# Patient Record
Sex: FEMALE | Race: OTHER | URBAN - METROPOLITAN AREA
[De-identification: names, ages, dates, MRNs, and addresses within clinical notes are randomized per-mention and may not be internally consistent; named-entity substitution may affect disease eponyms.]

---

## 2018-09-04 ENCOUNTER — HOSPITAL ENCOUNTER (OUTPATIENT)
Age: 28
Setting detail: SPECIMEN
Discharge: HOME | End: 2018-09-04
Payer: COMMERCIAL

## 2018-09-04 LAB
ABSOLUTE EOS #: 0.06 K/UL (ref 0–0.44)
ABSOLUTE IMMATURE GRANULOCYTE: 0.04 K/UL (ref 0–0.3)
ABSOLUTE LYMPH #: 2.56 K/UL (ref 1.1–3.7)
ABSOLUTE MONO #: 0.27 K/UL (ref 0.1–1.2)
BASOPHILS # BLD: 0 % (ref 0–2)
BASOPHILS ABSOLUTE: <0.03 K/UL (ref 0–0.2)
DIFFERENTIAL TYPE: ABNORMAL
EOSINOPHILS RELATIVE PERCENT: 1 % (ref 1–4)
IGE: 968 IU/ML
IMMATURE GRANULOCYTES: 1 %
LYMPHOCYTES # BLD: 31 % (ref 24–43)
MONOCYTES # BLD: 3 % (ref 3–12)
PLATELET ESTIMATE: ABNORMAL
RBC # BLD: ABNORMAL 10*6/UL
SEG NEUTROPHILS: 64 % (ref 36–65)
SEGMENTED NEUTROPHILS ABSOLUTE COUNT: 5.28 K/UL (ref 1.5–8.1)
WBC # BLD: 8.2 K/UL (ref 3.5–11.3)
WBC # BLD: ABNORMAL 10*3/UL

## 2018-09-05 LAB — ANTI-NUCLEAR ANTIBODY (ANA): NEGATIVE

## 2021-12-01 ENCOUNTER — OFFICE VISIT (OUTPATIENT)
Dept: FAMILY MEDICINE CLINIC | Age: 31
End: 2021-12-01
Payer: COMMERCIAL

## 2021-12-01 ENCOUNTER — HOSPITAL ENCOUNTER (OUTPATIENT)
Age: 31
Setting detail: SPECIMEN
Discharge: HOME | End: 2021-12-01

## 2021-12-01 VITALS
HEART RATE: 78 BPM | DIASTOLIC BLOOD PRESSURE: 78 MMHG | OXYGEN SATURATION: 99 % | SYSTOLIC BLOOD PRESSURE: 106 MMHG | TEMPERATURE: 98.5 F | RESPIRATION RATE: 15 BRPM

## 2021-12-01 DIAGNOSIS — Z11.52 ENCOUNTER FOR SCREENING FOR COVID-19: ICD-10-CM

## 2021-12-01 DIAGNOSIS — J06.9 URI WITH COUGH AND CONGESTION: Primary | ICD-10-CM

## 2021-12-01 DIAGNOSIS — Z20.822 EXPOSURE TO COVID-19 VIRUS: ICD-10-CM

## 2021-12-01 PROCEDURE — 99202 OFFICE O/P NEW SF 15 MIN: CPT | Performed by: NURSE PRACTITIONER

## 2021-12-01 RX ORDER — ACETAMINOPHEN AND CODEINE PHOSPHATE 120; 12 MG/5ML; MG/5ML
0.35 SOLUTION ORAL DAILY
COMMUNITY
Start: 2021-01-15

## 2021-12-01 ASSESSMENT — ENCOUNTER SYMPTOMS
RHINORRHEA: 0
NAUSEA: 0
VOMITING: 0
SHORTNESS OF BREATH: 0
SORE THROAT: 0
COUGH: 1
CHEST TIGHTNESS: 0
EYE PAIN: 0

## 2021-12-01 ASSESSMENT — PATIENT HEALTH QUESTIONNAIRE - PHQ9
SUM OF ALL RESPONSES TO PHQ QUESTIONS 1-9: 0
SUM OF ALL RESPONSES TO PHQ QUESTIONS 1-9: 0
1. LITTLE INTEREST OR PLEASURE IN DOING THINGS: 0
2. FEELING DOWN, DEPRESSED OR HOPELESS: 0
SUM OF ALL RESPONSES TO PHQ9 QUESTIONS 1 & 2: 0
SUM OF ALL RESPONSES TO PHQ QUESTIONS 1-9: 0

## 2021-12-01 NOTE — PATIENT INSTRUCTIONS
Patient Education        Learning About Coronavirus [phone])  What is coronavirus (COVID-19)? COVID-19 is a disease caused by a type of coronavirus. This illness was first found in December 2019. It has since spread worldwide. Coronaviruses are a large group of viruses. They cause the common cold. They also cause more serious illnesses like [place] respiratory syndrome (MERS) and severe acute respiratory syndrome (SARS). COVID-19 is caused by a novel coronavirus. That means it's a new type that has not been seen in people before. What are the symptoms? COVID-19 symptoms may include:  · Fever. · Cough. · Trouble breathing. · Chills or repeated shaking with chills. · Muscle and body aches. · Headache. · Sore throat. · New loss of taste or smell. · Vomiting. · Diarrhea. In severe cases, COVID-19 can cause pneumonia and make it hard to breathe without help from a machine. It can cause death. How is it diagnosed? COVID-19 is diagnosed with a viral test. This may also be called a PCR test or antigen test. It looks for evidence of the virus in your breathing passages or lungs (respiratory system). The test is most often done on a sample from the nose, throat, or lungs. It's sometimes done on a sample of saliva. One way a sample is collected is by putting a long swab into the back of your nose. How is it treated? Mild cases of COVID-19 can be treated at home. Serious cases need treatment in the hospital. Treatment may include medicines to reduce symptoms, plus breathing support such as oxygen therapy or a ventilator. Some people may be placed on their belly to help their oxygen levels. Treatments that may help people who have COVID-19 include:  Antiviral medicines. These medicines treat viral infections. Remdesivir is an example. Immune-based therapy. These medicines help the immune system fight COVID-19. Examples include monoclonal antibodies. Blood thinners.    These medicines help prevent blood clots. People with severe illness are at risk for blood clots. How can you protect yourself and others? The best way to protect yourself from getting sick is to:  · Get vaccinated. · Avoid sick people. · If you are not fully vaccinated:  ? Wear a mask if you have to go to public areas. ? Avoid crowds and try to stay at least 6 feet away from other people. · Cover your mouth with a tissue when you cough or sneeze. · Wash your hands often, especially after you cough or sneeze. Use soap and water, and scrub for at least 20 seconds. If soap and water aren't available, use an alcohol-based hand . · Avoid touching your mouth, nose, and eyes. To help avoid spreading the virus to others:  · Get vaccinated. · Cover your mouth with a tissue when you cough or sneeze. · Wash your hands often, especially after you cough or sneeze. Use soap and water, and scrub for at least 20 seconds. If soap and water aren't available, use an alcohol-based hand . · If you have been exposed to the virus and are not fully vaccinated:  ? Stay home. Don't go to school, work, or public areas. And don't use public transportation, ride-shares, or taxis unless you have no choice. ? Wear a mask if you have to go to public areas, like the pharmacy. · If you're sick:  ? Leave your home only if you need to get medical care. But call the doctor's office first so they know you're coming. And wear a mask. ? Wear a mask whenever you're around other people. ? Limit contact with pets and people in your home. If possible, stay in a separate bedroom and use a separate bathroom. ? Clean and disinfect your home every day. Use household  and disinfectant wipes or sprays. Take special care to clean things that you touch with your hands. How can you self-isolate when you have COVID-19? If you have COVID-19, there are things you can do to help avoid spreading the virus to others.   · Limit contact with people in your home. If possible, stay in a separate bedroom and use a separate bathroom. · Wear a mask when you are around other people. · If you have to leave home, avoid crowds and try to stay at least 6 feet away from other people. · Avoid contact with pets and other animals. · Cover your mouth and nose with a tissue when you cough or sneeze. Then throw it in the trash right away. · Wash your hands often, especially after you cough or sneeze. Use soap and water, and scrub for at least 20 seconds. If soap and water aren't available, use an alcohol-based hand . · Don't share personal household items. These include bedding, towels, cups and glasses, and eating utensils. · [address] in the warmest water allowed for the fabric type, and dry it completely. It's okay to wash other people's laundry with yours. · Clean and disinfect your home. Use household  and disinfectant wipes or sprays. When should you call for help? Call 911 anytime you think you may need emergency care. For example, call if you have life-threatening symptoms, such as:    · You have severe trouble breathing. (You can't talk at all.)     · You have constant chest pain or pressure.     · You are severely dizzy or lightheaded.     · You are confused or can't think clearly.     · You have pale, gray, or blue-colored skin or lips.     · You pass out (lose consciousness) or are very hard to wake up. Call your doctor now or seek immediate medical care if:    · You have moderate trouble breathing. (You can't speak a full sentence.)     · You are coughing up blood (more than about 1 teaspoon).     · You have signs of low blood pressure. These include feeling lightheaded; being too weak to stand; and having cold, pale, clammy skin.    Watch closely for changes in your health, and be sure to contact your doctor if:    · Your symptoms get worse.     · You are not getting better as expected.     · You have new or worse symptoms of anxiety, depression, nightmares, or flashbacks. Call before you go to the doctor's office. Follow their instructions. And wear a mask. Current as of: July 1, 2021               Content Version: 13.0  © 2006-2021 [employer]. Care instructions adapted under license by [facility] ([facility]). If you have questions about a medical condition or this instruction, always ask your healthcare professional. [place] [address] any warranty or liability for your use of this information. Patient Education        Upper Respiratory Infection (Cold): Care Instructions  Your Care Instructions     An upper respiratory infection, or URI, is an infection of the nose, sinuses, or throat. URIs are spread by coughs, sneezes, and direct contact. The common cold is the most frequent kind of URI. The flu and sinus infections are other kinds of URIs. Almost all URIs are caused by viruses. Antibiotics won't cure them. But you can treat most infections with home care. This may include drinking lots of fluids and taking over-the-counter pain medicine. You will probably feel better in 4 to 10 days. The doctor has checked you carefully, but problems can develop later. If you notice any problems or new symptoms, get medical treatment right away. Follow-up care is a key part of your treatment and safety. Be sure to make and go to all appointments, and call your doctor if you are having problems. It's also a good idea to know your test results and keep a list of the medicines you take. How can you care for yourself at home? · To prevent dehydration, drink plenty of fluids. Choose water and other clear liquids until you feel better. If you have kidney, heart, or liver disease and have to limit fluids, talk with your doctor before you increase the amount of fluids you drink. · Take an over-the-counter pain medicine, such as acetaminophen (Tylenol), ibuprofen (Advil, Motrin), or naproxen (Aleve).  Read and follow all instructions on the label. · Before you use cough and cold medicines, check the label. These medicines may not be safe for young children or for people with certain health problems. · Be careful when taking over-the-counter cold or flu medicines and Tylenol at the same time. Many of these medicines have acetaminophen, which is Tylenol. Read the labels to make sure that you are not taking more than the recommended dose. Too much acetaminophen (Tylenol) can be harmful. · Get plenty of rest.  · Do not smoke or allow others to smoke around you. If you need help quitting, talk to your doctor about stop-smoking programs and medicines. These can increase your chances of quitting for good. When should you call for help? Call 911 anytime you think you may need emergency care. For example, call if:    · You have severe trouble breathing. Call your doctor now or seek immediate medical care if:    · You seem to be getting much sicker.     · You have new or worse trouble breathing.     · You have a new or higher fever.     · You have a new rash. Watch closely for changes in your health, and be sure to contact your doctor if:    · You have a new symptom, such as a sore throat, an earache, or sinus pain.     · You cough more deeply or more often, especially if you notice more mucus or a change in the color of your mucus.     · You do not get better as expected. Where can you learn more? Go to https://[employer] e[employer]eb[employer]. org and sign in to your [facility] account. Enter L795 in the [facility] box to learn more about \"Upper Respiratory Infection (Cold): Care Instructions. \"     If you do not have an account, please click on the \"Sign Up Now\" link. Current as of: July 6, 2021               Content Version: 13.0  © 5636-4704 [employer]. Care instructions adapted under license by [facility] ([facility]).  If you have questions about a medical condition or this instruction, always ask your healthcare professional. [address] any warranty or liability for your use of this information.

## 2021-12-01 NOTE — PROGRESS NOTES
[address] [facility]  [address] [place] [address]  Dept: [phone]  Dept Fax: [phone]    Thad Sweet is a 31 y.o. female who presents today for her medical conditions/complaints of   Chief Complaint   Patient presents with    Congestion     x3 days    Chest Congestion     x3 days    Nasal Congestion     x3 days brother + for covid. HPI:     /78 (Site: Right Upper Arm, Position: Sitting, Cuff Size: Medium Adult)   Pulse 78   Temp 98.5 °F (36.9 °C) (Temporal)   Resp 15   SpO2 99%       HPI  Pt presented to the clinic today with c/o congestion. This is a new problem. The current episode started 3 days ago. The problem has been unchanged since onset. Associated symptoms include: cough- dry, body aches, headache . Pertinent negatives include: No fever, chills, SOB, chest pain, abdominal pain, loss of taste, smell. Pt has tried Dayquil and Nyquil with some improvement. Vaccinated for COVID:  YES  History of COVID:  YES   Exposure to COVID:  Yes by brother on Thanksgiving. History reviewed. No pertinent past medical history. History reviewed. No pertinent surgical history. History reviewed. No pertinent family history. Social History     Tobacco Use    Smoking status: Never Smoker    Smokeless tobacco: Never Used   Substance Use Topics    Alcohol use: Not on file        Prior to Visit Medications    Medication Sig Taking? Authorizing Provider   norethindrone (MICRONOR) 0.35 MG tablet Take 0.35 mg by mouth daily Yes Historical Provider, MD       Allergies   Allergen Reactions    Penicillins Hives         Subjective:      Review of Systems   Constitutional: Negative for chills and fever. HENT: Positive for congestion. Negative for ear pain, rhinorrhea and sore throat. Eyes: Negative for pain and visual disturbance. Respiratory: Positive for cough. Negative for chest tightness and shortness of breath. Cardiovascular: Negative for chest pain, palpitations and leg swelling. Gastrointestinal: Negative for nausea and vomiting. Genitourinary: Negative for decreased urine volume and difficulty urinating. Musculoskeletal: Positive for arthralgias and myalgias. Negative for gait problem and neck pain. Skin: Negative for pallor and rash. Neurological: Positive for headaches. Negative for weakness and light-headedness. Psychiatric/Behavioral: Negative for sleep disturbance. Objective:     Physical Exam  Vitals and nursing note reviewed. Constitutional:       General: She is not in acute distress. Appearance: Normal appearance. HENT:      Head: Normocephalic and atraumatic. Right Ear: Tympanic membrane and ear canal normal.      Left Ear: Tympanic membrane and ear canal normal.      Nose: Congestion present. Mouth/Throat:      Lips: Pink. Mouth: Mucous membranes are moist.      Pharynx: Oropharynx is clear. Uvula midline. Eyes:      Extraocular Movements: Extraocular movements intact. Conjunctiva/sclera: Conjunctivae normal.   Cardiovascular:      Rate and Rhythm: Normal rate and regular rhythm. Pulses: Normal pulses. Pulmonary:      Effort: Pulmonary effort is normal. No tachypnea. Breath sounds: Normal breath sounds. Abdominal:      General: Bowel sounds are normal.      Palpations: Abdomen is soft. Musculoskeletal:         General: Normal range of motion. Cervical back: Normal range of motion and neck supple. Skin:     General: Skin is warm and dry. Capillary Refill: Capillary refill takes less than 2 seconds. Coloration: Skin is not pale. Neurological:      Mental Status: She is alert and oriented to person, place, and time. Coordination: Coordination normal.      Gait: Gait normal.   Psychiatric:         Mood and Affect: Mood normal.         Thought Content:  Thought content normal.           MEDICAL DECISION MAKING Assessment/Plan:     Kedar Lees was seen today for congestion, chest congestion and nasal congestion. Diagnoses and all orders for this visit:    URI with cough and congestion    Exposure to COVID-19 virus    Encounter for screening for COVID-19  -     COVID-19; Future        Results for orders placed or performed during the hospital encounter of 09/04/18   CELINE   Result Value Ref Range    CELINE NEGATIVE NEG   Differential   Result Value Ref Range    Differential Type NOT REPORTED     WBC Morphology NOT REPORTED     RBC Morphology NOT REPORTED     Platelet Estimate NOT REPORTED     Seg Neutrophils 64 36 - 65 %    Lymphocytes 31 24 - 43 %    Monocytes 3 3 - 12 %    Eosinophils % 1 1 - 4 %    Basophils 0 0 - 2 %    Immature Granulocytes 1 (H) 0 %    Segs Absolute 5.28 1.50 - 8.10 k/uL    Absolute Lymph # 2.56 1.10 - 3.70 k/uL    Absolute Mono # 0.27 0.10 - 1.20 k/uL    Absolute Eos # 0.06 0.00 - 0.44 k/uL    Basophils Absolute <0.03 0.00 - 0.20 k/uL    Absolute Immature Granulocyte 0.04 0.00 - 0.30 k/uL   IgE   Result Value Ref Range    IgE 968 (H) <101 IU/mL   WBC   Result Value Ref Range    WBC 8.2 3.5 - 11.3 k/uL     Based on the history and exam, I suspect viral illness. Will send out COVID19 testing. Possible treatment alterations based on the results. Patient instructed to self-quarantine until testing results are back- and to follow the quarantine instructions in the after visit summary. Tylenol / Motrin as directed on the bottle as needed for fever/pain. Increase fluids, rest.   The patient indicates understanding of these issues and agrees with the plan. Educational materials provided on AVS.  Follow up if symptoms do not improve/worsen. Call with any questions or concerns. Discussed symptoms that will warrant urgent ED evaluation/treatment. Preventing the Spread of Coronavirus Disease 2019 in Homes and Residential Communities:   For the most recent information go to:

## 2021-12-02 LAB
SARS-COV-2: NORMAL
SARS-COV-2: NOT DETECTED
SOURCE: NORMAL